# Patient Record
Sex: MALE | Race: NATIVE HAWAIIAN OR OTHER PACIFIC ISLANDER
[De-identification: names, ages, dates, MRNs, and addresses within clinical notes are randomized per-mention and may not be internally consistent; named-entity substitution may affect disease eponyms.]

---

## 2020-06-23 ENCOUNTER — HOSPITAL ENCOUNTER (INPATIENT)
Dept: HOSPITAL 15 - ER | Age: 55
LOS: 1 days | Discharge: HOME | DRG: 309 | End: 2020-06-24
Attending: FAMILY MEDICINE | Admitting: NURSE PRACTITIONER
Payer: COMMERCIAL

## 2020-06-23 VITALS — WEIGHT: 271.83 LBS | HEIGHT: 72 IN | BODY MASS INDEX: 36.82 KG/M2

## 2020-06-23 VITALS — SYSTOLIC BLOOD PRESSURE: 122 MMHG | DIASTOLIC BLOOD PRESSURE: 64 MMHG

## 2020-06-23 VITALS — DIASTOLIC BLOOD PRESSURE: 72 MMHG | SYSTOLIC BLOOD PRESSURE: 115 MMHG

## 2020-06-23 DIAGNOSIS — Z95.5: ICD-10-CM

## 2020-06-23 DIAGNOSIS — E86.0: ICD-10-CM

## 2020-06-23 DIAGNOSIS — I25.10: ICD-10-CM

## 2020-06-23 DIAGNOSIS — E66.9: ICD-10-CM

## 2020-06-23 DIAGNOSIS — E78.5: ICD-10-CM

## 2020-06-23 DIAGNOSIS — I50.22: ICD-10-CM

## 2020-06-23 DIAGNOSIS — K21.9: ICD-10-CM

## 2020-06-23 DIAGNOSIS — G93.0: ICD-10-CM

## 2020-06-23 DIAGNOSIS — E78.00: ICD-10-CM

## 2020-06-23 DIAGNOSIS — Z95.810: ICD-10-CM

## 2020-06-23 DIAGNOSIS — R00.1: Primary | ICD-10-CM

## 2020-06-23 DIAGNOSIS — I11.0: ICD-10-CM

## 2020-06-23 DIAGNOSIS — I24.9: ICD-10-CM

## 2020-06-23 LAB
ALBUMIN SERPL-MCNC: 3.7 G/DL (ref 3.4–5)
ALP SERPL-CCNC: 81 U/L (ref 45–117)
ALT SERPL-CCNC: 32 U/L (ref 16–61)
ANION GAP SERPL CALCULATED.3IONS-SCNC: 5 MMOL/L (ref 5–15)
APTT PPP: 24.8 SEC (ref 23.64–32.05)
BILIRUB SERPL-MCNC: 0.6 MG/DL (ref 0.2–1)
BUN SERPL-MCNC: 17 MG/DL (ref 7–18)
BUN/CREAT SERPL: 17.9
CALCIUM SERPL-MCNC: 8.7 MG/DL (ref 8.5–10.1)
CHLORIDE SERPL-SCNC: 111 MMOL/L (ref 98–107)
CO2 SERPL-SCNC: 23 MMOL/L (ref 21–32)
GLUCOSE SERPL-MCNC: 130 MG/DL (ref 74–106)
HCT VFR BLD AUTO: 44.9 % (ref 41–53)
HGB BLD-MCNC: 14.9 G/DL (ref 13.5–17.5)
INR PPP: 1.07 (ref 0.9–1.15)
MCH RBC QN AUTO: 29.9 PG (ref 28–32)
MCV RBC AUTO: 89.8 FL (ref 80–100)
NRBC BLD QL AUTO: 0.2 %
POTASSIUM SERPL-SCNC: 4 MMOL/L (ref 3.5–5.1)
PROT SERPL-MCNC: 7 G/DL (ref 6.4–8.2)
SODIUM SERPL-SCNC: 139 MMOL/L (ref 136–145)

## 2020-06-23 PROCEDURE — 80053 COMPREHEN METABOLIC PANEL: CPT

## 2020-06-23 PROCEDURE — 86141 C-REACTIVE PROTEIN HS: CPT

## 2020-06-23 PROCEDURE — 85730 THROMBOPLASTIN TIME PARTIAL: CPT

## 2020-06-23 PROCEDURE — 71045 X-RAY EXAM CHEST 1 VIEW: CPT

## 2020-06-23 PROCEDURE — 36415 COLL VENOUS BLD VENIPUNCTURE: CPT

## 2020-06-23 PROCEDURE — 93306 TTE W/DOPPLER COMPLETE: CPT

## 2020-06-23 PROCEDURE — 70450 CT HEAD/BRAIN W/O DYE: CPT

## 2020-06-23 PROCEDURE — 84484 ASSAY OF TROPONIN QUANT: CPT

## 2020-06-23 PROCEDURE — 85610 PROTHROMBIN TIME: CPT

## 2020-06-23 PROCEDURE — 85025 COMPLETE CBC W/AUTO DIFF WBC: CPT

## 2020-06-23 RX ADMIN — CARVEDILOL SCH MG: 3.12 TABLET, FILM COATED ORAL at 21:58

## 2020-06-23 NOTE — NUR
Telemetry admit from ER

EDSON DOMINGO admitted to Telemetry unit. No call to alert RN that patient is coming to floor. 
No report received.  Patient oriented to RADHA LAWTON RN primary RN, unit, room, bed, and 
unit policies regarding patient care and visiting hours. Patient now on continuous telemetry 
monitoring, tele box #[id number]. Patient weighed by bedscale and encouraged to call if they need 
something. All questions and concerns addressed, patient verbalized understanding.

## 2020-06-23 NOTE — NUR
IV insertion

IV access obtained, via clean sterile technique by inserting 22 gauge catheter at left hand. 
IV secured properly. No trauma to site. Patient tolerated well.

## 2020-06-24 VITALS — DIASTOLIC BLOOD PRESSURE: 78 MMHG | SYSTOLIC BLOOD PRESSURE: 111 MMHG

## 2020-06-24 VITALS — DIASTOLIC BLOOD PRESSURE: 66 MMHG | SYSTOLIC BLOOD PRESSURE: 124 MMHG

## 2020-06-24 VITALS — SYSTOLIC BLOOD PRESSURE: 111 MMHG | DIASTOLIC BLOOD PRESSURE: 78 MMHG

## 2020-06-24 PROCEDURE — 4B02XTZ MEASUREMENT OF CARDIAC DEFIBRILLATOR, EXTERNAL APPROACH: ICD-10-PCS | Performed by: FAMILY MEDICINE

## 2020-06-24 RX ADMIN — CARVEDILOL SCH MG: 3.12 TABLET, FILM COATED ORAL at 09:16

## 2020-06-24 NOTE — NUR
Biotronik

Biotronik at bedside to adjust patient's setting. Setting is now 40 per Dr. Lam's 
orders. Dr. Blas aware. Patient will be discharged.

## 2020-06-24 NOTE — NUR
Low Heart Rate

Notified Dr. Blas about patient's decrease in heart over the night per report. Dr. Blas 
verbalized understanding. No new orders given. Patient's device has been adjusted this 
morning. Will discharge patient and instruct to follow-up with cardiologist.

## 2020-06-24 NOTE — NUR
Care report given to NASREEN Garcia, and told to relay to M.d. regarding patient has 2 x 
episode of Heart rate of 24-30 for few seconds not sustained and goes back to normal heart 
rate, patient is asymptomatic.

## 2020-06-24 NOTE — NUR
Discharge

Discharge instructions given as ordered. Encourage to follow up with PMD as instructed. 
Patient given information for both Dr. Lam and Dr. Germain, patient's primary 
cardiologist. All questions and concerns addressed. Patient verbalized understanding.  
Medication reconciliation form completed and copy given to patient. IV removed with catheter 
intact, pressure dressing applied.  Telemetry unit returned to ICU. Patient ambulated to 
lobby to wait for ride. Patient refused wheelchair at this time.